# Patient Record
Sex: FEMALE | Race: WHITE | Employment: FULL TIME | URBAN - METROPOLITAN AREA
[De-identification: names, ages, dates, MRNs, and addresses within clinical notes are randomized per-mention and may not be internally consistent; named-entity substitution may affect disease eponyms.]

---

## 2017-07-07 ENCOUNTER — OFFICE VISIT (OUTPATIENT)
Dept: FAMILY MEDICINE CLINIC | Age: 37
End: 2017-07-07

## 2017-07-07 VITALS
HEIGHT: 65 IN | WEIGHT: 154 LBS | HEART RATE: 72 BPM | BODY MASS INDEX: 25.66 KG/M2 | SYSTOLIC BLOOD PRESSURE: 113 MMHG | OXYGEN SATURATION: 99 % | DIASTOLIC BLOOD PRESSURE: 75 MMHG | RESPIRATION RATE: 16 BRPM | TEMPERATURE: 98.3 F

## 2017-07-07 DIAGNOSIS — Z01.419 WELL WOMAN EXAM WITH ROUTINE GYNECOLOGICAL EXAM: ICD-10-CM

## 2017-07-07 DIAGNOSIS — Z01.419 ENCOUNTER FOR WELL WOMAN EXAM: Primary | ICD-10-CM

## 2017-07-07 DIAGNOSIS — Z23 ENCOUNTER FOR IMMUNIZATION: ICD-10-CM

## 2017-07-07 DIAGNOSIS — R53.83 FATIGUE, UNSPECIFIED TYPE: ICD-10-CM

## 2017-07-07 NOTE — MR AVS SNAPSHOT
Visit Information Date & Time Provider Department Dept. Phone Encounter #  
 7/7/2017  9:20 AM Hillary Peñaloza, [facility] [phone] [id number] Upcoming Health Maintenance Date Due DTaP/Tdap/Td series (1 - Tdap) 6/11/2003 INFLUENZA AGE 9 TO ADULT 8/1/2017 PAP AKA CERVICAL CYTOLOGY 2/13/2018 Allergies as of 7/7/2017  Review Complete On: 2/18/2016 By: Hillary Peñaloza MD  
 No Known Allergies Current Immunizations  Reviewed on 2/13/2015 Name Date Influenza Vaccine 11/13/2014 Tdap 7/7/2017 Not reviewed this visit You Were Diagnosed With   
  
 Codes Comments Encounter for well woman exam    -  Primary ICD-10-CM: G98.798 ICD-9-CM: V72.31 Encounter for immunization     ICD-10-CM: U47 ICD-9-CM: V03.89 Fatigue, unspecified type     ICD-10-CM: R53.83 ICD-9-CM: 780.79 Vitals BP Pulse Temp Resp Height(growth percentile) Weight(growth percentile) 113/75 72 98.3 °F (36.8 °C) (Oral) 16 5' 5\" (1.651 m) 154 lb (69.9 kg) LMP SpO2 BMI OB Status Smoking Status 06/12/2017 (Exact Date) 99% 25.63 kg/m2 Having regular periods Never Smoker Vitals History BMI and BSA Data Body Mass Index Body Surface Area  
 25.63 kg/m 2 1.79 m 2 Preferred Pharmacy Pharmacy Name Phone [facility] [address], [address]  [phone] Your Updated Medication List  
  
   
This list is accurate as of: 7/7/17 10:18 AM.  Always use your most recent med list.  
  
  
  
  
 CALTRATE 600+D PLUS MINERALS 600 mg calcium- 400 unit Tab Generic drug:  Calcium Carbonate-Vit D3-Min Take  by mouth. PROBIOTIC PO Take  by mouth. We Performed the Following CBC W/O DIFF [24198 CPT(R)] LIPID PANEL [57884 CPT(R)] METABOLIC PANEL, COMPREHENSIVE [53717 CPT(R)]  TETANUS, DIPHTHERIA TOXOIDS AND ACELLULAR PERTUSSIS VACCINE (TDAP), IN INDIVIDS. >=7, IM B573701 CPT(R)] TSH REFLEX TO T4 [[id number] Custom] Introducing [facility]! [facility] introduces [employer] patient portal. Now you can access parts of your medical record, email your doctor's office, and request medication refills online. 1. In your internet browser, go to [email or web address]://[email or web address]. [employer] 2. Click on the First Time User? Click Here link in the Sign In box. You will see the New Member Sign Up page. 3. Enter your [employer] Access Code exactly as it appears below. You will not need to use this code after youve completed the sign-up process. If you do not sign up before the expiration date, you must request a new code. · [employer] Access Code: [email or web address] Expires: 10/5/2017 10:18 AM 
 
4. Enter the last four digits of your Social Security Number (xxxx) and Date of Birth (mm/dd/yyyy) as indicated and click Submit. You will be taken to the next sign-up page. 5. Create a [employer] ID. This will be your [employer] login ID and cannot be changed, so think of one that is secure and easy to remember. 6. Create a [employer] password. You can change your password at any time. 7. Enter your Password Reset Question and Answer. This can be used at a later time if you forget your password. 8. Enter your e-mail address. You will receive e-mail notification when new information is available in [address] 9. Click Sign Up. You can now view and download portions of your medical record. 10. Click the Download Summary menu link to download a portable copy of your medical information. If you have questions, please visit the Frequently Asked Questions section of the [employer] website. Remember, [employer] is NOT to be used for urgent needs. For medical emergencies, dial 911. Now available from your iPhone and Android! Please provide this summary of care documentation to your next provider. Your primary care clinician is listed as [address] [place] [address]. If you have any questions after today's visit, please call [phone].

## 2017-07-07 NOTE — PROGRESS NOTES
Chief Complaint   Patient presents with    Complete Physical     1. Have you been to the ER, urgent care clinic since your last visit? Hospitalized since your last visit? No    2. Have you seen or consulted any other health care providers outside of the [address] [place] [address] since your last visit? Include any pap smears or colon screening.  No    Pap--2015--no abnormalities    Encounter tetanus

## 2017-07-07 NOTE — PROGRESS NOTES
Presents for annual exam    Now caring for four children -- two of her own, two foster children    Has 6, 8, 6, and almost 3year old -- 8and 3year old are sister and brother, foster children    [place] [employer] calling to adopt    Now her 9th and 10th child fostering -- has had these two children for 16 months    Now working as  at the [employer] school    Feeling much better after a week of vacation  States rough year  Physically does not feel that she is has been taking care of herself    Finished her job last week  Slowly feeling better    Emotionally draining but getting better    [employer] to [place] -- stayed at a [employer] last week    Started taking liquid vitamin D and vitamin B12    Periods -- a little off  Four days late  3 days early the month before   vasectomy    No breast problems  Gets tender before her period  No lumps    Constipation is much better -- uses miralax when she needs it    2/2015 negative and negative HPV HR; no hx of abnormal PAP smear    Subjective:   30 y.o. female for Well Woman Check. Patient's last menstrual period was 06/12/2017 (exact date). Social History: single partner, contraception - vasectomy. Pertinent past medical history:     Patient Active Problem List   Diagnosis Code   (none) - all problems resolved or deleted     Current Outpatient Prescriptions   Medication Sig Dispense Refill    LACTOBACILLUS ACIDOPHILUS (PROBIOTIC PO) Take  by mouth.  Calcium Carbonate-Vit D3-Min (CALTRATE 600+D PLUS MINERALS) 600 mg calcium- 400 unit tab Take  by mouth. No Known Allergies     ROS:  Feeling a little off. No dyspnea or chest pain on exertion. No abdominal pain, change in bowel habits, black or bloody stools. Constipation problem has improved. No urinary tract symptoms. GYN ROS: no breast pain or new or enlarging lumps on self exam, she complains of periods are not as regular as previously. No neurological complaints.     Objective:     Visit Vitals    /75    Pulse 72    Temp 98.3 °F (36.8 °C) (Oral)    Resp 16    Ht 5' 5\" (1.651 m)    Wt 154 lb (69.9 kg)    LMP 06/12/2017 (Exact Date)    SpO2 99%    BMI 25.63 kg/m2     The patient appears well, alert, oriented x 3, in no distress. ENT normal.  Neck supple. No adenopathy or thyromegaly. OIDN. Lungs are clear, good air entry, no wheezes, rhonchi or rales. S1 and S2 normal, no murmurs, regular rate and rhythm. Abdomen soft without tenderness, guarding, mass or organomegaly. Extremities show no edema, normal peripheral pulses. Neurological is normal, no focal findings. BREAST EXAM: not examined    PELVIC EXAM: normal external genitalia, vulva, vagina, cervix, uterus and adnexa    Assessment/Plan:   well woman  pap smear  return annually or prn    ICD-10-CM ICD-9-CM    1. Encounter for well woman exam Z01.419 V72.31 LIPID PANEL      METABOLIC PANEL, COMPREHENSIVE      CBC W/O DIFF      PAP IG, APTIMA HPV AND RFX 16/18,45 ([id number])   2. Encounter for immunization Z23 V03.89 TETANUS, DIPHTHERIA TOXOIDS AND ACELLULAR PERTUSSIS VACCINE (TDAP), IN INDIVIDS. >=7, IM   3. Fatigue, unspecified type R53.83 780.79 TSH REFLEX TO T4   4. Well woman exam with routine gynecological exam Z01.419 V72.31     [V72.31]   .

## 2017-07-07 NOTE — LETTER
7/17/2017 8:53 AM 
 
Ms. Elizabeth Clemente [address] [address] Dear Elizabeth Clemente: 
 
Please find your most recent results below. Resulted Orders LIPID PANEL Result Value Ref Range Cholesterol, total 154 100 - 199 mg/dL Triglyceride 64 0 - 149 mg/dL HDL Cholesterol 71 >39 mg/dL VLDL, calculated 13 5 - 40 mg/dL LDL, calculated 70 0 - 99 mg/dL Narrative Performed at:  [address]  [id number] : Delma Chadwick MD, Phone:  [phone] METABOLIC PANEL, COMPREHENSIVE Result Value Ref Range Glucose 85 65 - 99 mg/dL BUN 10 6 - 20 mg/dL Creatinine 0.78 0.57 - 1.00 mg/dL GFR est non-AA 99 >59 mL/min/1.73 GFR est  >59 mL/min/1.73  
 BUN/Creatinine ratio 13 9 - 23 Sodium 140 134 - 144 mmol/L Potassium 4.8 3.5 - 5.2 mmol/L Chloride 101 96 - 106 mmol/L  
 CO2 22 18 - 29 mmol/L Calcium 9.4 8.7 - 10.2 mg/dL Protein, total 7.2 6.0 - 8.5 g/dL Albumin 4.6 3.5 - 5.5 g/dL GLOBULIN, TOTAL 2.6 1.5 - 4.5 g/dL A-G Ratio 1.8 1.2 - 2.2 Bilirubin, total 1.3 (H) 0.0 - 1.2 mg/dL Alk. phosphatase 58 39 - 117 IU/L  
 AST (SGOT) 18 0 - 40 IU/L  
 ALT (SGPT) 15 0 - 32 IU/L Narrative Performed at:  [address]  [id number] : Delma Chadwick MD, Phone:  [phone] CBC W/O DIFF Result Value Ref Range WBC 5.8 3.4 - 10.8 x10E3/uL  
 RBC 4.49 3.77 - 5.28 x10E6/uL HGB 13.6 11.1 - 15.9 g/dL HCT 40.3 34.0 - 46.6 % MCV 90 79 - 97 fL  
 MCH 30.3 26.6 - 33.0 pg  
 MCHC 33.7 31.5 - 35.7 g/dL  
 RDW 13.2 12.3 - 15.4 % PLATELET 821 678 - 441 x10E3/uL Narrative Performed at:  [address]  [id number] : Delma Chadwick MD, Phone:  [phone] TSH REFLEX TO T4 Result Value Ref Range TSH 1.710 0.450 - 4.500 uIU/mL Narrative Performed at:  [address]  [id number] : Chacha Saunders MD, Phone:  [phone] PAP IG, APTIMA HPV AND RFX 16/18,45 ([id number]) Result Value Ref Range Diagnosis Comment Comment:  
   NEGATIVE FOR INTRAEPITHELIAL LESION AND MALIGNANCY. Specimen adequacy Comment Comment:  
   Satisfactory for evaluation. Endocervical and/or squamous metaplastic 
cells (endocervical component) are present. Clinician provided ICD10 Comment Comment:  
   Z01.419 Performed by: Comment Comment:  
   Arleth Em, Cytotechnologist (ASCP) Surendra Saenz Note: Comment Comment: The Pap smear is a screening test designed to aid in the detection of 
premalignant and malignant conditions of the uterine cervix. It is not a 
diagnostic procedure and should not be used as the sole means of detecting 
cervical cancer. Both false-positive and false-negative reports do occur. Test methodology Comment Comment: This liquid based ThinPrep(R) pap test was screened with the 
use of an image guided system. HPV APTIMA Negative Negative Comment: This test detects fourteen high-risk HPV types (16/18/31/33/35/39/45/ 
51/52/56/58/59/66/68) without differentiation. Narrative Specimen Comment: Source. ........... Surendra Saenz Endocervix Specimen Comment: No. of containers. Surendra Saenz 01 CYTYC Thin Prep Vial 
Performed at:  [address]  [id number] : Chacha Saunders MD, Phone:  [phone] Performed at:  [address] 
[address]  [id number] : Chacha Saunders MD, Phone:  [phone] CVD REPORT Result Value Ref Range INTERPRETATION Note Comment:  
   Supplement report is available. Narrative Performed at:  [address] [place] [address]  [id number] : Garo Garibay PhD, Phone:  [phone] RECOMMENDATIONS: 
 
Negative PAP smear and negative HPV HR Please call me if you have any questions: [phone] Sincerely, Hannah Jones MD

## 2017-07-07 NOTE — LETTER
7/10/2017 2:49 PM 
 
Ms. Ronal Mendez [address] [address] Dear Ronal Mendez: 
 
Please find your most recent results below. Resulted Orders LIPID PANEL Result Value Ref Range Cholesterol, total 154 100 - 199 mg/dL Triglyceride 64 0 - 149 mg/dL HDL Cholesterol 71 >39 mg/dL VLDL, calculated 13 5 - 40 mg/dL LDL, calculated 70 0 - 99 mg/dL Narrative Performed at:  [address]  [id number] : Amado Ahuja MD, Phone:  [phone] METABOLIC PANEL, COMPREHENSIVE Result Value Ref Range Glucose 85 65 - 99 mg/dL BUN 10 6 - 20 mg/dL Creatinine 0.78 0.57 - 1.00 mg/dL GFR est non-AA 99 >59 mL/min/1.73 GFR est  >59 mL/min/1.73  
 BUN/Creatinine ratio 13 9 - 23 Sodium 140 134 - 144 mmol/L Potassium 4.8 3.5 - 5.2 mmol/L Chloride 101 96 - 106 mmol/L  
 CO2 22 18 - 29 mmol/L Calcium 9.4 8.7 - 10.2 mg/dL Protein, total 7.2 6.0 - 8.5 g/dL Albumin 4.6 3.5 - 5.5 g/dL GLOBULIN, TOTAL 2.6 1.5 - 4.5 g/dL A-G Ratio 1.8 1.2 - 2.2 Bilirubin, total 1.3 (H) 0.0 - 1.2 mg/dL Alk. phosphatase 58 39 - 117 IU/L  
 AST (SGOT) 18 0 - 40 IU/L  
 ALT (SGPT) 15 0 - 32 IU/L Narrative Performed at:  [address]  [id number] : Amado Ahuja MD, Phone:  [phone] CBC W/O DIFF Result Value Ref Range WBC 5.8 3.4 - 10.8 x10E3/uL  
 RBC 4.49 3.77 - 5.28 x10E6/uL HGB 13.6 11.1 - 15.9 g/dL HCT 40.3 34.0 - 46.6 % MCV 90 79 - 97 fL  
 MCH 30.3 26.6 - 33.0 pg  
 MCHC 33.7 31.5 - 35.7 g/dL  
 RDW 13.2 12.3 - 15.4 % PLATELET 767 851 - 090 x10E3/uL Narrative Performed at:  [address]  [id number] : Amado Ahuja MD, Phone:  [phone] TSH REFLEX TO T4 Result Value Ref Range TSH 1.710 0.450 - 4.500 uIU/mL Narrative Performed at:  [address]  [id number] : Mayela Kam MD, Phone:  [phone] CVD REPORT Result Value Ref Range INTERPRETATION Note Comment:  
   Supplement report is available. Narrative Performed at:  [address] [place] [address]  [id number] : Kimball Brittle PhD, Phone:  [phone] RECOMMENDATIONS: 
 
Normal cholesterol level Normal glucose level Normal kidney function Slightly increased bilirubin level (just barely -- recommend repeating in 3-6 months after good night's rest) No anemia Normal thyroid function Please call me if you have any questions: [phone] Sincerely, Kenney Hancock MD

## 2017-07-08 LAB
ALBUMIN SERPL-MCNC: 4.6 G/DL (ref 3.5–5.5)
ALBUMIN/GLOB SERPL: 1.8 {RATIO} (ref 1.2–2.2)
ALP SERPL-CCNC: 58 IU/L (ref 39–117)
ALT SERPL-CCNC: 15 IU/L (ref 0–32)
AST SERPL-CCNC: 18 IU/L (ref 0–40)
BILIRUB SERPL-MCNC: 1.3 MG/DL (ref 0–1.2)
BUN SERPL-MCNC: 10 MG/DL (ref 6–20)
BUN/CREAT SERPL: 13 (ref 9–23)
CALCIUM SERPL-MCNC: 9.4 MG/DL (ref 8.7–10.2)
CHLORIDE SERPL-SCNC: 101 MMOL/L (ref 96–106)
CHOLEST SERPL-MCNC: 154 MG/DL (ref 100–199)
CO2 SERPL-SCNC: 22 MMOL/L (ref 18–29)
CREAT SERPL-MCNC: 0.78 MG/DL (ref 0.57–1)
ERYTHROCYTE [DISTWIDTH] IN BLOOD BY AUTOMATED COUNT: 13.2 % (ref 12.3–15.4)
GLOBULIN SER CALC-MCNC: 2.6 G/DL (ref 1.5–4.5)
GLUCOSE SERPL-MCNC: 85 MG/DL (ref 65–99)
HCT VFR BLD AUTO: 40.3 % (ref 34–46.6)
HDLC SERPL-MCNC: 71 MG/DL
HGB BLD-MCNC: 13.6 G/DL (ref 11.1–15.9)
INTERPRETATION, 910389: NORMAL
LDLC SERPL CALC-MCNC: 70 MG/DL (ref 0–99)
MCH RBC QN AUTO: 30.3 PG (ref 26.6–33)
MCHC RBC AUTO-ENTMCNC: 33.7 G/DL (ref 31.5–35.7)
MCV RBC AUTO: 90 FL (ref 79–97)
PLATELET # BLD AUTO: 242 X10E3/UL (ref 150–379)
POTASSIUM SERPL-SCNC: 4.8 MMOL/L (ref 3.5–5.2)
PROT SERPL-MCNC: 7.2 G/DL (ref 6–8.5)
RBC # BLD AUTO: 4.49 X10E6/UL (ref 3.77–5.28)
SODIUM SERPL-SCNC: 140 MMOL/L (ref 134–144)
TRIGL SERPL-MCNC: 64 MG/DL (ref 0–149)
TSH SERPL DL<=0.005 MIU/L-ACNC: 1.71 UIU/ML (ref 0.45–4.5)
VLDLC SERPL CALC-MCNC: 13 MG/DL (ref 5–40)
WBC # BLD AUTO: 5.8 X10E3/UL (ref 3.4–10.8)

## 2017-07-09 NOTE — PROGRESS NOTES
Normal cholesterol level  Normal glucose level  Normal kidney function  Slightly increased bilirubin level (just barely -- recommend repeating in 3-6 months after good night's rest)  No anemia  Normal thyroid function

## 2017-07-13 LAB
CYTOLOGIST CVX/VAG CYTO: NORMAL
CYTOLOGY CVX/VAG DOC THIN PREP: NORMAL
DX ICD CODE: NORMAL
HPV I/H RISK 4 DNA CVX QL PROBE+SIG AMP: NEGATIVE
Lab: NORMAL
OTHER STN SPEC: NORMAL
PATH REPORT.FINAL DX SPEC: NORMAL
STAT OF ADQ CVX/VAG CYTO-IMP: NORMAL

## 2018-07-09 ENCOUNTER — TELEPHONE (OUTPATIENT)
Dept: FAMILY MEDICINE CLINIC | Age: 38
End: 2018-07-09

## 2018-07-09 DIAGNOSIS — Z01.419 WELL WOMAN EXAM: Primary | ICD-10-CM

## 2018-07-09 NOTE — TELEPHONE ENCOUNTER
----- Message from Suzie Marcos sent at 7/9/2018 12:12 PM EDT -----  Regarding: Dr. Masha Chow / Telephone   Pt has an upcoming appt on Monday, August 6, 2018 at 2p. Pt would like to speak with somebody to come in earlier to do her blood work.  Best contact: [phone]

## 2018-07-13 NOTE — TELEPHONE ENCOUNTER
Second attempt at calling patient to schedule lab appointment. Left voicemail for patient to return call.

## 2018-07-27 ENCOUNTER — LAB ONLY (OUTPATIENT)
Dept: FAMILY MEDICINE CLINIC | Age: 38
End: 2018-07-27

## 2018-07-27 DIAGNOSIS — Z01.419 WELL WOMAN EXAM: ICD-10-CM

## 2018-07-28 LAB
ALBUMIN SERPL-MCNC: 4.7 G/DL (ref 3.5–5.5)
ALBUMIN/GLOB SERPL: 2 {RATIO} (ref 1.2–2.2)
ALP SERPL-CCNC: 65 IU/L (ref 39–117)
ALT SERPL-CCNC: 13 IU/L (ref 0–32)
AST SERPL-CCNC: 21 IU/L (ref 0–40)
BILIRUB SERPL-MCNC: 0.9 MG/DL (ref 0–1.2)
BUN SERPL-MCNC: 11 MG/DL (ref 6–20)
BUN/CREAT SERPL: 16 (ref 9–23)
CALCIUM SERPL-MCNC: 9.6 MG/DL (ref 8.7–10.2)
CHLORIDE SERPL-SCNC: 102 MMOL/L (ref 96–106)
CHOLEST SERPL-MCNC: 143 MG/DL (ref 100–199)
CO2 SERPL-SCNC: 24 MMOL/L (ref 20–29)
CREAT SERPL-MCNC: 0.69 MG/DL (ref 0.57–1)
ERYTHROCYTE [DISTWIDTH] IN BLOOD BY AUTOMATED COUNT: 13 % (ref 12.3–15.4)
EST. AVERAGE GLUCOSE BLD GHB EST-MCNC: 97 MG/DL
GLOBULIN SER CALC-MCNC: 2.4 G/DL (ref 1.5–4.5)
GLUCOSE SERPL-MCNC: 77 MG/DL (ref 65–99)
HBA1C MFR BLD: 5 % (ref 4.8–5.6)
HCT VFR BLD AUTO: 42.7 % (ref 34–46.6)
HDLC SERPL-MCNC: 60 MG/DL
HGB BLD-MCNC: 14 G/DL (ref 11.1–15.9)
INTERPRETATION, 910389: NORMAL
LDLC SERPL CALC-MCNC: 66 MG/DL (ref 0–99)
MCH RBC QN AUTO: 29.5 PG (ref 26.6–33)
MCHC RBC AUTO-ENTMCNC: 32.8 G/DL (ref 31.5–35.7)
MCV RBC AUTO: 90 FL (ref 79–97)
PLATELET # BLD AUTO: 269 X10E3/UL (ref 150–379)
POTASSIUM SERPL-SCNC: 4.2 MMOL/L (ref 3.5–5.2)
PROT SERPL-MCNC: 7.1 G/DL (ref 6–8.5)
RBC # BLD AUTO: 4.74 X10E6/UL (ref 3.77–5.28)
SODIUM SERPL-SCNC: 138 MMOL/L (ref 134–144)
TRIGL SERPL-MCNC: 86 MG/DL (ref 0–149)
TSH SERPL DL<=0.005 MIU/L-ACNC: 3.52 UIU/ML (ref 0.45–4.5)
VLDLC SERPL CALC-MCNC: 17 MG/DL (ref 5–40)
WBC # BLD AUTO: 6.4 X10E3/UL (ref 3.4–10.8)

## 2018-07-28 NOTE — PROGRESS NOTES
She needs to be rescheduled. It says she has an appt with me on Aug 6th at 2 pm but I won't be there that day. Thanks.

## 2018-08-07 ENCOUNTER — OFFICE VISIT (OUTPATIENT)
Dept: FAMILY MEDICINE CLINIC | Age: 38
End: 2018-08-07

## 2018-08-07 VITALS
TEMPERATURE: 98.1 F | BODY MASS INDEX: 26.66 KG/M2 | DIASTOLIC BLOOD PRESSURE: 77 MMHG | HEIGHT: 65 IN | RESPIRATION RATE: 16 BRPM | SYSTOLIC BLOOD PRESSURE: 125 MMHG | HEART RATE: 73 BPM | OXYGEN SATURATION: 99 % | WEIGHT: 160 LBS

## 2018-08-07 DIAGNOSIS — F43.22 ADJUSTMENT DISORDER WITH ANXIOUS MOOD: ICD-10-CM

## 2018-08-07 DIAGNOSIS — Z01.419 WELL WOMAN EXAM WITH ROUTINE GYNECOLOGICAL EXAM: Primary | ICD-10-CM

## 2018-08-07 NOTE — PATIENT INSTRUCTIONS

## 2018-08-07 NOTE — PROGRESS NOTES
Efe Varela  41 y.o. female  1980  [address]  [id number]   [address]: Progress Note  Jaret Moeller MD       Encounter Date: 2018    Chief Complaint   Patient presents with    Complete Physical     with pap     History of Present Illness   Efe Varela is a 41 y.o. female who presents to clinic today for CPE without pap. Specific concerns today: dyspnea related to stress. Prev on zoloft in  for FRANCIS and panic disorder. She has had increased life stressors with foster children, adoption. She has been doing family therapy. She denies any other complaints since her last visit. Pertinent past medical history: no history of HTN, DVT, CAD, DM, liver disease, migraines or smoking. OB History:  OB History      Para Term  AB Living    2 2 2  0 2    SAB TAB Ectopic Molar Multiple Live Births                 Obstetric Comments    S/p   and  6 lbs 15 oz and 7 lbs 15 oz. No complications. [id number], spontaneous vaginal delivery  Prenatal complications: no complications. GYN history:  Patient's last menstrual period was 2018. Cycle: regular, Duration: 5 days  Sexually Active?: yes with ;    Method of family planning/contraception?: s/p vasectomy  History of STDs?: none. Preventative care:  Last PAP (7017): NILM HPV neg due in   - Results:  o Every three years with cytology alone for women 24to 34years of age  o Every three years with cytology alone or every five years if combined with HPV testing in women 27to 72years of age  o Screening may be stopped at 71 y/o or after total hysterectomy if normal screening exams  Depression screen:    Over the past 2 weeks, have you felt down, depressed, or hopeless? no   Over the past 2 weeks, have you felt little interest or pleasure in doing things? No  FRANCIS 7-score of 7  Last Flu Shot: up to date.   TDAP in last 10 yrs: UTD  Folic acid 400-800mg if of childbearing age: sporadically compliant with her [employer]  Exercise and diet: diet going ok. Exercise is walking with her children  Women 54to 78years of age should take 75 mg of aspirin per day if no contraindication:   Screen for 1-time HCV screening if born btwn 1667-9098    Any family history of gyn cancers (breast, ovarian or cervical ca)? Negative. Social History:  Tobacco use: none  Alcohol use: 8 times per week. Less during the school year. Denies any concerns for her drinking. Or any need to cut back. Do you feel safe at home? Yes.   single partner, contraception - vasectomy. Review of Systems   Review of Systems   Constitutional: Negative. Psychiatric/Behavioral: Negative for depression, hallucinations, substance abuse and suicidal ideas. The patient is nervous/anxious. All other systems reviewed and are negative. Vitals/Objective:     Vitals:    08/07/18 1008   BP: 125/77   Pulse: 73   Resp: 16   Temp: 98.1 °F (36.7 °C)   TempSrc: Oral   SpO2: 99%   Weight: 160 lb (72.6 kg)   Height: 5' 5\" (1.651 m)     Body mass index is 26.63 kg/(m^2). Physical Exam   Constitutional: She appears well-developed and well-nourished. No distress. HENT:   Head: Normocephalic and atraumatic. Right Ear: Tympanic membrane normal.   Left Ear: Tympanic membrane normal.   Mouth/Throat: Uvula is midline, oropharynx is clear and moist and mucous membranes are normal.   Eyes: Pupils are equal, round, and reactive to light. Neck: Neck supple. No thyromegaly present. Cardiovascular: Normal rate, regular rhythm and normal heart sounds. Exam reveals no gallop and no friction rub. No murmur heard. Pulmonary/Chest: Effort normal and breath sounds normal. No respiratory distress. She has no wheezes. She has no rales. Right breast exhibits no inverted nipple, no mass, no nipple discharge, no skin change and no tenderness.  Left breast exhibits no inverted nipple, no mass, no nipple discharge, no skin change and no tenderness. Abdominal: Soft. Bowel sounds are normal. She exhibits no distension. There is no tenderness. There is no rebound and no guarding. Genitourinary: Vagina normal and uterus normal. Cervix exhibits no motion tenderness, no discharge and no friability. No vaginal discharge found. Genitourinary Comments: Exam chaperoned by ABRIL   Skin: She is not diaphoretic. Vitals reviewed. Lab Results   Component Value Date/Time    WBC 6.4 07/27/2018 08:05 AM    HGB 14.0 07/27/2018 08:05 AM    HCT 42.7 07/27/2018 08:05 AM    PLATELET 684 57/89/3188 08:05 AM    MCV 90 07/27/2018 08:05 AM     Lab Results   Component Value Date/Time    Sodium 138 07/27/2018 08:05 AM    Potassium 4.2 07/27/2018 08:05 AM    Chloride 102 07/27/2018 08:05 AM    CO2 24 07/27/2018 08:05 AM    Glucose 77 07/27/2018 08:05 AM    BUN 11 07/27/2018 08:05 AM    Creatinine 0.69 07/27/2018 08:05 AM    BUN/Creatinine ratio 16 07/27/2018 08:05 AM    GFR est  07/27/2018 08:05 AM    GFR est non- 07/27/2018 08:05 AM    Calcium 9.6 07/27/2018 08:05 AM    Bilirubin, total 0.9 07/27/2018 08:05 AM    AST (SGOT) 21 07/27/2018 08:05 AM    Alk. phosphatase 65 07/27/2018 08:05 AM    Protein, total 7.1 07/27/2018 08:05 AM    Albumin 4.7 07/27/2018 08:05 AM    A-G Ratio 2.0 07/27/2018 08:05 AM    ALT (SGPT) 13 07/27/2018 08:05 AM       No results found for this or any previous visit (from the past 24 hour(s)). Assessment and Plan:   1. Well woman exam with routine gynecological exam  Labs and pap UTD. 2. Adjustment disorder with anxious mood  Neg PHQ 2 and FRANCIS 7 mildly elevated. No current medication at this time. Cont counseling.   - BEHAV ASSMT W/SCORE & DOCD/STAND INSTRUMENT    I have discussed the diagnosis with the patient and the intended plan as seen in the above orders. she has expressed understanding. The patient has received an after-visit summary and questions were answered concerning future plans. I have discussed medication side effects and warnings with the patient as well. Follow-up Disposition:  Return in about 1 year (around 8/7/2019), or if symptoms worsen or fail to improve, for WWE. Electronically Signed: Long Su MD     History   Patients past medical, surgical and family histories were reviewed and updated. Past Medical History:   Diagnosis Date    Constipation      Past Surgical History:   Procedure Laterality Date    IR INJ/ ASP/ ARTHRO SMALL JOINT / BURSA  2001    jaw     Family History   Problem Relation Age of Onset    High Cholesterol Father     Cancer Paternal Grandfather     Heart Disease Paternal Grandfather     Arthritis-rheumatoid Paternal Grandfather     Diabetes Maternal Grandmother     Heart Attack Maternal Grandmother     High Cholesterol Maternal Grandmother     Heart Disease Maternal Grandfather     High Cholesterol Maternal Grandfather     High Cholesterol Paternal Grandmother      Social History     Social History    Marital status:      Spouse name: N/A    Number of children: N/A    Years of education: N/A     Occupational History    teacher      Social History Main Topics    Smoking status: Never Smoker    Smokeless tobacco: Never Used    Alcohol use 0.0 oz/week     1 - 2 Glasses of wine per week    Drug use: No    Sexual activity: Yes     Partners: Male     Birth control/ protection: None     Other Topics Concern    Not on file     Social History Narrative            Current Medications/Allergies     Current Outpatient Prescriptions   Medication Sig Dispense Refill    LACTOBACILLUS ACIDOPHILUS (PROBIOTIC PO) Take  by mouth.  Calcium Carbonate-Vit D3-Min (CALTRATE 600+D PLUS MINERALS) 600 mg calcium- 400 unit tab Take  by mouth.        No Known Allergies

## 2018-08-07 NOTE — PROGRESS NOTES
Chief Complaint   Patient presents with    Complete Physical     with pap     1. Have you been to the ER, urgent care clinic since your last visit? Hospitalized since your last visit? No    2. Have you seen or consulted any other health care providers outside of the [address] [place] [address] since your last visit? Include any pap smears or colon screening.  No

## 2019-12-19 ENCOUNTER — OFFICE VISIT (OUTPATIENT)
Dept: FAMILY MEDICINE CLINIC | Age: 39
End: 2019-12-19

## 2019-12-19 VITALS
HEART RATE: 74 BPM | BODY MASS INDEX: 25.33 KG/M2 | SYSTOLIC BLOOD PRESSURE: 111 MMHG | WEIGHT: 152 LBS | RESPIRATION RATE: 18 BRPM | DIASTOLIC BLOOD PRESSURE: 73 MMHG | OXYGEN SATURATION: 99 % | HEIGHT: 65 IN | TEMPERATURE: 98.3 F

## 2019-12-19 DIAGNOSIS — Z00.00 HEALTHCARE MAINTENANCE: Primary | ICD-10-CM

## 2019-12-19 DIAGNOSIS — Z00.00 HEALTHCARE MAINTENANCE: ICD-10-CM

## 2019-12-19 PROBLEM — E55.9 VITAMIN D DEFICIENCY: Status: ACTIVE | Noted: 2019-12-19

## 2019-12-19 NOTE — PATIENT INSTRUCTIONS
Well Visit, Ages 25 to 48: Care Instructions Your Care Instructions Physical exams can help you stay healthy. Your doctor has checked your overall health and may have suggested ways to take good care of yourself. He or she also may have recommended tests. At home, you can help prevent illness with healthy eating, regular exercise, and other steps. Follow-up care is a key part of your treatment and safety. Be sure to make and go to all appointments, and call your doctor if you are having problems. It's also a good idea to know your test results and keep a list of the medicines you take. How can you care for yourself at home? · Reach and stay at a healthy weight. This will lower your risk for many problems, such as obesity, diabetes, heart disease, and high blood pressure. · Get at least 30 minutes of physical activity on most days of the week. Walking is a good choice. You also may want to do other activities, such as running, swimming, cycling, or playing tennis or team sports. Discuss any changes in your exercise program with your doctor. · Do not smoke or allow others to smoke around you. If you need help quitting, talk to your doctor about stop-smoking programs and medicines. These can increase your chances of quitting for good. · Talk to your doctor about whether you have any risk factors for sexually transmitted infections (STIs). Having one sex partner (who does not have STIs and does not have sex with anyone else) is a good way to avoid these infections. · Use birth control if you do not want to have children at this time. Talk with your doctor about the choices available and what might be best for you. · Protect your skin from too much sun. When you're outdoors from 10 a.m. to 4 p.m., stay in the shade or cover up with clothing and a hat with a wide brim. Wear sunglasses that block UV rays. Even when it's cloudy, put broad-spectrum sunscreen (SPF 30 or higher) on any exposed skin. · See a dentist one or two times a year for checkups and to have your teeth cleaned. · Wear a seat belt in the car. Follow your doctor's advice about when to have certain tests. These tests can spot problems early. For everyone · Cholesterol. Have the fat (cholesterol) in your blood tested after age 21. Your doctor will tell you how often to have this done based on your age, family history, or other things that can increase your risk for heart disease. · Blood pressure. Have your blood pressure checked during a routine doctor visit. Your doctor will tell you how often to check your blood pressure based on your age, your blood pressure results, and other factors. · Vision. Talk with your doctor about how often to have a glaucoma test. 
· Diabetes. Ask your doctor whether you should have tests for diabetes. · Colon cancer. Your risk for colorectal cancer gets higher as you get older. Some experts say that adults should start regular screening at age 48 and stop at age 76. Others say to start before age 48 or continue after age 76. Talk with your doctor about your risk and when to start and stop screening. For women · Breast exam and mammogram. Talk to your doctor about when you should have a clinical breast exam and a mammogram. Medical experts differ on whether and how often women under 50 should have these tests. Your doctor can help you decide what is right for you. · Cervical cancer screening test and pelvic exam. Begin with a Pap test at age 24. The test often is part of a pelvic exam. Starting at age 27, you may choose to have a Pap test, an HPV test, or both tests at the same time (called co-testing). Talk with your doctor about how often to have testing. · Tests for sexually transmitted infections (STIs). Ask whether you should have tests for STIs. You may be at risk if you have sex with more than one person, especially if your partners do not wear condoms. For men · Tests for sexually transmitted infections (STIs). Ask whether you should have tests for STIs. You may be at risk if you have sex with more than one person, especially if you do not wear a condom. · Testicular cancer exam. Ask your doctor whether you should check your testicles regularly. · Prostate exam. Talk to your doctor about whether you should have a blood test (called a PSA test) for prostate cancer. Experts differ on whether and when men should have this test. Some experts suggest it if you are older than 39 and are -American or have a father or brother who got prostate cancer when he was younger than 72. When should you call for help? Watch closely for changes in your health, and be sure to contact your doctor if you have any problems or symptoms that concern you. Where can you learn more? Go to http://isabella-kurtis.info/. Enter P072 in the search box to learn more about \"Well Visit, Ages 25 to 48: Care Instructions. \" Current as of: December 13, 2018 Content Version: 12.2 © 6845-2809 [employer]. Care instructions adapted under license by [employer] (which disclaims liability or warranty for this information). If you have questions about a medical condition or this instruction, always ask your healthcare professional. [address] any warranty or liability for your use of this information.

## 2019-12-19 NOTE — PROGRESS NOTES
Identified Patient with two Patient identifiers (Name and ). Two Patient Identifiers confirmed. Reviewed record in preparation for visit and have obtained necessary documentation. Chief Complaint   Patient presents with    Complete Physical       Visit Vitals  /73 (BP 1 Location: Right arm, BP Patient Position: Sitting)   Pulse 74   Temp 98.3 °F (36.8 °C) (Oral)   Resp 18   Ht 5' 5\" (1.651 m)   Wt 152 lb (68.9 kg)   SpO2 99%   BMI 25.29 kg/m²       1. Have you been to the ER, urgent care clinic since your last visit? Hospitalized since your last visit? No    2. Have you seen or consulted any other health care providers outside of the [address] [place] [address] since your last visit? Include any pap smears or colon screening.  No

## 2019-12-19 NOTE — PROGRESS NOTES
HPI:  James Moore is a 42 y.o. female presenting for CPE. PMH: Vit D Deficiency    On chart review had a nodule in 2011 after delivering however resolved after breastfeeding. Ultrasound showed no cystic or solid mass, BI-RADS 2.  Annual screening mammography recommended at age 36. Vitamin D deficiency: IN 2014 Vit D was 25, took vitamin d for a couple of years. LMP 12/10/19  Length of periods: 5 days  Regular  Menstrual flow: light. [id number]  Sexually active?: yes  Method of family planning:  had a vastectomy  Diet: healthy, vegetables daily   Exercise: walks, 9000 steps is a      Immunizations - reviewed:   Immunization History   Administered Date(s) Administered    Influenza Vaccine 11/13/2014    Tdap 07/07/2017     Flu: up to date got it at work       Health Maintenance reviewed -  Pap smear 2017 next due 2022  Mammogram  Annual screening mammography recommended at age 36 per ultrasound  Paternal grandmother was diagnosed with breast cancer at in her 62s  Colonoscopy no colon cancer  HIV testing negative    Allergies- reviewed:   No Known Allergies      Medications- reviewed:   Current Outpatient Medications   Medication Sig    LACTOBACILLUS ACIDOPHILUS (PROBIOTIC PO) Take  by mouth.  Calcium Carbonate-Vit D3-Min (CALTRATE 600+D PLUS MINERALS) 600 mg calcium- 400 unit tab Take  by mouth. No current facility-administered medications for this visit.           Past Medical History- reviewed:  Past Medical History:   Diagnosis Date    Constipation          Past Surgical History- reviewed:   Past Surgical History:   Procedure Laterality Date    IR INJ/ ASP/ ARTHRO SMALL JOINT / BURSA  2001    jaw         Family History - reviewed:  Family History   Problem Relation Age of Onset    High Cholesterol Father     Cancer Paternal Grandfather     Heart Disease Paternal Grandfather     Arthritis-rheumatoid Paternal Grandfather     Diabetes Maternal Grandmother     Heart Attack Maternal Grandmother     High Cholesterol Maternal Grandmother     Heart Disease Maternal Grandfather     High Cholesterol Maternal Grandfather     High Cholesterol Paternal Grandmother          Social History - reviewed:  Social History     Socioeconomic History    Marital status:      Spouse name: Not on file    Number of children: Not on file    Years of education: Not on file    Highest education level: Not on file   Occupational History    Occupation: teacher   Social Needs    Financial resource strain: Not on file    Food insecurity:     Worry: Not on file     Inability: Not on file    Transportation needs:     Medical: Not on file     Non-medical: Not on file   Tobacco Use    Smoking status: Never Smoker    Smokeless tobacco: Never Used   Substance and Sexual Activity    Alcohol use:  Yes     Alcohol/week: 0.0 standard drinks     Types: 1 - 2 Glasses of wine per week    Drug use: No    Sexual activity: Yes     Partners: Male     Birth control/protection: None   Lifestyle    Physical activity:     Days per week: Not on file     Minutes per session: Not on file    Stress: Not on file   Relationships    Social connections:     Talks on phone: Not on file     Gets together: Not on file     Attends Church service: Not on file     Active member of club or organization: Not on file     Attends meetings of clubs or organizations: Not on file     Relationship status: Not on file    Intimate partner violence:     Fear of current or ex partner: Not on file     Emotionally abused: Not on file     Physically abused: Not on file     Forced sexual activity: Not on file   Other Topics Concern    Not on file   Social History Narrative    Not on file           Review of Systems   CONSTITUTIONAL: Denies: fever, chills  BREASTS: No masses or nipple discharge      Physical Exam  Visit Vitals  /73 (BP 1 Location: Right arm, BP Patient Position: Sitting)   Pulse 74   Temp 98.3 °F (36.8 °C) (Oral)   Resp 18   Ht 5' 5\" (1.651 m)   Wt 152 lb (68.9 kg)   LMP 12/10/2019 (Exact Date)   SpO2 99%   BMI 25.29 kg/m²       General appearance - alert, well appearing, and in no distress  Ears - bilateral TM's and external ear canals normal  Nose - normal and patent, no erythema, discharge or polyps  Mouth - mucous membranes moist, pharynx normal without lesions  Neck - supple, no significant adenopathy, thyroid exam: thyroid is normal in size without nodules or tenderness  Chest - clear to auscultation, no wheezes, rales or rhonchi, symmetric air entry  Heart - normal rate, regular rhythm, normal S1, S2, no murmurs, rubs, clicks or gallops  Abdomen - soft, nontender, nondistended, no masses or organomegaly  Neurological - alert, oriented, normal speech, no focal findings or movement disorder noted  Musculoskeletal - no joint tenderness, deformity or swelling  Extremities - no pedal edema noted  Skin - normal coloration and turgor, no rashes, no suspicious skin lesions noted  Pelvic - not indicated  Breast - Mammogram ordered today. Breast exam deferred. The American Cancer Society recommends not performing clinical breast examination, given the potential for false-positive findings and lack of evidence for improved outcomes. The USPSTF states that evidence is insufficient to assess additional benefits of clinical breast examination beyond mammography. Assessment/Plan:  Mrs. Eva Marcos is a healthy 42 y.o F with history of vit d deficiency coming in for CPE. Health maintenance:  - Labs ordered: lipid panel, CMP, CBC, lipid panel, A1c (family history of T2DM) and vit D  - Mammogram at 42y.o.  - Pap due in 2022  - Up to date on immunizations. I have discussed the diagnosis with the patient and the intended plan as seen in the above orders. The patient has received an after-visit summary and questions were answered concerning future plans.   I have discussed medication side effects and warnings with the patient as well. Informed pt to return to the office if new symptoms arise. Patient was discussed with Dr. Jodrin Barrera, Attending Physician.      Emmett Barry MD

## 2019-12-20 LAB
25(OH)D3+25(OH)D2 SERPL-MCNC: 32.8 NG/ML (ref 30–100)
ALBUMIN SERPL-MCNC: 5 G/DL (ref 3.5–5.5)
ALBUMIN/GLOB SERPL: 1.9 {RATIO} (ref 1.2–2.2)
ALP SERPL-CCNC: 70 IU/L (ref 39–117)
ALT SERPL-CCNC: 13 IU/L (ref 0–32)
AST SERPL-CCNC: 16 IU/L (ref 0–40)
BILIRUB SERPL-MCNC: 1 MG/DL (ref 0–1.2)
BUN SERPL-MCNC: 8 MG/DL (ref 6–20)
BUN/CREAT SERPL: 10 (ref 9–23)
CALCIUM SERPL-MCNC: 9.8 MG/DL (ref 8.7–10.2)
CHLORIDE SERPL-SCNC: 102 MMOL/L (ref 96–106)
CHOLEST SERPL-MCNC: 154 MG/DL (ref 100–199)
CO2 SERPL-SCNC: 24 MMOL/L (ref 20–29)
CREAT SERPL-MCNC: 0.78 MG/DL (ref 0.57–1)
ERYTHROCYTE [DISTWIDTH] IN BLOOD BY AUTOMATED COUNT: 11.7 % (ref 12.3–15.4)
EST. AVERAGE GLUCOSE BLD GHB EST-MCNC: 94 MG/DL
GLOBULIN SER CALC-MCNC: 2.6 G/DL (ref 1.5–4.5)
GLUCOSE SERPL-MCNC: 83 MG/DL (ref 65–99)
HBA1C MFR BLD: 4.9 % (ref 4.8–5.6)
HCT VFR BLD AUTO: 41.3 % (ref 34–46.6)
HDLC SERPL-MCNC: 75 MG/DL
HGB BLD-MCNC: 14.2 G/DL (ref 11.1–15.9)
INTERPRETATION, 910389: NORMAL
LDLC SERPL CALC-MCNC: 69 MG/DL (ref 0–99)
MCH RBC QN AUTO: 30.4 PG (ref 26.6–33)
MCHC RBC AUTO-ENTMCNC: 34.4 G/DL (ref 31.5–35.7)
MCV RBC AUTO: 88 FL (ref 79–97)
PLATELET # BLD AUTO: 313 X10E3/UL (ref 150–450)
POTASSIUM SERPL-SCNC: 4.3 MMOL/L (ref 3.5–5.2)
PROT SERPL-MCNC: 7.6 G/DL (ref 6–8.5)
RBC # BLD AUTO: 4.67 X10E6/UL (ref 3.77–5.28)
SODIUM SERPL-SCNC: 140 MMOL/L (ref 134–144)
TRIGL SERPL-MCNC: 49 MG/DL (ref 0–149)
VLDLC SERPL CALC-MCNC: 10 MG/DL (ref 5–40)
WBC # BLD AUTO: 7.1 X10E3/UL (ref 3.4–10.8)

## 2022-03-18 PROBLEM — E55.9 VITAMIN D DEFICIENCY: Status: ACTIVE | Noted: 2019-12-19

## 2024-07-17 ENCOUNTER — TELEPHONE (OUTPATIENT)
Facility: CLINIC | Age: 44
End: 2024-07-17

## 2024-07-17 NOTE — TELEPHONE ENCOUNTER
Notified patient that Dr. Pepper is not accepting any new patients, but did get scheduled with Annie Bob NP.

## 2024-07-17 NOTE — TELEPHONE ENCOUNTER
----- Message from Alejandra Levy MA sent at 7/17/2024 12:36 PM EDT -----  Regarding: FW: [facility] Appointment Request    ----- Message -----  From: Leighton Rivas  Sent: 7/17/2024  12:16 PM EDT  To: [facility] Clinical Staff  Subject: [facility] Appointment Request                          [facility] Appointment Request    Patient needs appointment for [facility] Appointment Type: New to Provider.    Patient Requested Dates(s): any available  Patient Requested Time:any available  Provider Name: Daphne Pepper MD    Reason for Appointment Request: New Patient - No appointments available during search. Wants to establish care with Daphne Pepper MD or any provider that is accepting new patient if Daphne Pepper MD is not available.  --------------------------------------------------------------------------------------------------------------------------    Relationship to Patient: Self     Call Back Information: OK to leave message on voicemail  Preferred Call Back Number: Phone [phone]

## 2025-01-23 ENCOUNTER — OFFICE VISIT (OUTPATIENT)
Facility: CLINIC | Age: 45
End: 2025-01-23
Payer: COMMERCIAL

## 2025-01-23 VITALS
RESPIRATION RATE: 18 BRPM | SYSTOLIC BLOOD PRESSURE: 128 MMHG | WEIGHT: 185.2 LBS | OXYGEN SATURATION: 98 % | DIASTOLIC BLOOD PRESSURE: 78 MMHG | HEART RATE: 79 BPM | TEMPERATURE: 97.3 F | HEIGHT: 67 IN | BODY MASS INDEX: 29.07 KG/M2

## 2025-01-23 DIAGNOSIS — Z12.31 BREAST CANCER SCREENING BY MAMMOGRAM: ICD-10-CM

## 2025-01-23 DIAGNOSIS — Z11.4 ENCOUNTER FOR SCREENING FOR HIV: ICD-10-CM

## 2025-01-23 DIAGNOSIS — Z12.4 CERVICAL CANCER SCREENING: ICD-10-CM

## 2025-01-23 DIAGNOSIS — E55.9 VITAMIN D DEFICIENCY: ICD-10-CM

## 2025-01-23 DIAGNOSIS — Z76.89 ENCOUNTER TO ESTABLISH CARE: ICD-10-CM

## 2025-01-23 DIAGNOSIS — Z11.59 ENCOUNTER FOR HEPATITIS C SCREENING TEST FOR LOW RISK PATIENT: ICD-10-CM

## 2025-01-23 DIAGNOSIS — Z78.9 VARICELLA VACCINATION STATUS UNKNOWN: ICD-10-CM

## 2025-01-23 DIAGNOSIS — Z13.220 SCREENING FOR HYPERLIPIDEMIA: ICD-10-CM

## 2025-01-23 DIAGNOSIS — Z28.20 VACCINE REFUSED BY PATIENT: ICD-10-CM

## 2025-01-23 DIAGNOSIS — Z00.00 WELLNESS EXAMINATION: Primary | ICD-10-CM

## 2025-01-23 DIAGNOSIS — E66.3 OVERWEIGHT WITH BODY MASS INDEX (BMI) OF 29 TO 29.9 IN ADULT: ICD-10-CM

## 2025-01-23 PROCEDURE — 99386 PREV VISIT NEW AGE 40-64: CPT | Performed by: NURSE PRACTITIONER

## 2025-01-23 SDOH — ECONOMIC STABILITY: FOOD INSECURITY: WITHIN THE PAST 12 MONTHS, THE FOOD YOU BOUGHT JUST DIDN'T LAST AND YOU DIDN'T HAVE MONEY TO GET MORE.: NEVER TRUE

## 2025-01-23 SDOH — ECONOMIC STABILITY: FOOD INSECURITY: WITHIN THE PAST 12 MONTHS, YOU WORRIED THAT YOUR FOOD WOULD RUN OUT BEFORE YOU GOT MONEY TO BUY MORE.: NEVER TRUE

## 2025-01-23 ASSESSMENT — ENCOUNTER SYMPTOMS
CONSTIPATION: 0
ABDOMINAL PAIN: 0
TROUBLE SWALLOWING: 0
COUGH: 0
DIARRHEA: 0
SHORTNESS OF BREATH: 0
WHEEZING: 0

## 2025-01-23 ASSESSMENT — PATIENT HEALTH QUESTIONNAIRE - PHQ9
SUM OF ALL RESPONSES TO PHQ9 QUESTIONS 1 & 2: 0
1. LITTLE INTEREST OR PLEASURE IN DOING THINGS: NOT AT ALL
SUM OF ALL RESPONSES TO PHQ QUESTIONS 1-9: 0
2. FEELING DOWN, DEPRESSED OR HOPELESS: NOT AT ALL

## 2025-01-23 NOTE — PROGRESS NOTES
\"Have you been to the ER, urgent care clinic since your last visit?  Hospitalized since your last visit?\"    NO    “Have you seen or consulted any other health care providers outside our system since your last visit?”    NO  /78 (Site: Right Upper Arm, Position: Sitting, Cuff Size: Medium Adult)   Pulse 79   Temp 97.3 °F (36.3 °C) (Temporal)   Resp 18   Ht 1.702 m (5' 7\")   Wt 84 kg (185 lb 3.2 oz)   SpO2 98%   BMI 29.01 kg/m²   Chief Complaint   Patient presents with    Annual Exam     PHQ-9 Total Score: 0 (2025  7:31 AM)        No questionnaires available.                         Have you had a mammogram?”   2024 [facility]W  No breast cancer screening on file      “Have you had a pap smear?”    VPFW    Date of last Cervical Cancer screen (HPV or PAP): 2017         Click Here for Release of Records Request       Identified Patient with 2 Patient Identifiers-Name and

## 2025-01-23 NOTE — PROGRESS NOTES
Subjective  Chief Complaint   Patient presents with    Annual Exam     HPI:  Jeanette Santiago is a 44 y.o. female.  This is a new patient to the practice. Last seen by primary care 2019. Presents for wellness.    Immunizations:  Flu: Recommend, declines  COVID: Recommend, declines  Tetanus: 7/2017    HCV screen: Ordered  HIV screen: Ordered  LMP: 1/8/25  Pap: 8/2023 VPfW  Mammo: 8/2024 VPfW  Smoking status: Never    Moods: job anxiety  PHQ2: 0/2  Diet: healthy  Exercise: three days per week  Vision exams: annual  Dental exams: every 6 months      Past Medical History:   Diagnosis Date    Constipation     Shingles     possible misdiagnosis    TMJ (temporomandibular joint disorder)     Vitamin D deficiency      Family History   Problem Relation Age of Onset    Other Mother         Prediabetes    High Cholesterol Father     No Known Problems Brother     Diabetes Maternal Grandmother     Heart Disease Maternal Grandmother     Bone Cancer Maternal Grandmother     Osteoporosis Maternal Grandmother     High Cholesterol Maternal Grandfather     Heart Disease Maternal Grandfather     Heart Attack Maternal Grandfather     High Cholesterol Paternal Grandmother     Breast Cancer Paternal Grandmother     Lung Cancer Paternal Grandfather     Rheum Arthritis Paternal Grandfather     Heart Disease Paternal Grandfather      Social History     Socioeconomic History    Marital status:      Spouse name: Not on file    Number of children: Not on file    Years of education: Not on file    Highest education level: Not on file   Occupational History    Not on file   Tobacco Use    Smoking status: Never     Passive exposure: Never    Smokeless tobacco: Never   Vaping Use    Vaping status: Never Used   Substance and Sexual Activity    Alcohol use: Not Currently    Drug use: No    Sexual activity: Not on file   Other Topics Concern    Not on file   Social History Narrative    Not on file     Social Determinants of Health     Financial

## 2025-01-24 DIAGNOSIS — E55.9 VITAMIN D DEFICIENCY: Primary | ICD-10-CM

## 2025-01-24 PROBLEM — E78.00 PURE HYPERCHOLESTEROLEMIA: Status: ACTIVE | Noted: 2025-01-24

## 2025-01-24 LAB
25(OH)D3+25(OH)D2 SERPL-MCNC: 20 NG/ML (ref 30–100)
ALBUMIN SERPL-MCNC: 4.7 G/DL (ref 3.9–4.9)
ALP SERPL-CCNC: 83 IU/L (ref 44–121)
ALT SERPL-CCNC: 26 IU/L (ref 0–32)
AST SERPL-CCNC: 25 IU/L (ref 0–40)
BASOPHILS # BLD AUTO: 0 X10E3/UL (ref 0–0.2)
BASOPHILS NFR BLD AUTO: 1 %
BILIRUB SERPL-MCNC: 0.5 MG/DL (ref 0–1.2)
BUN SERPL-MCNC: 9 MG/DL (ref 6–24)
BUN/CREAT SERPL: 13 (ref 9–23)
CALCIUM SERPL-MCNC: 9.7 MG/DL (ref 8.7–10.2)
CHLORIDE SERPL-SCNC: 102 MMOL/L (ref 96–106)
CHOLEST SERPL-MCNC: 181 MG/DL (ref 100–199)
CO2 SERPL-SCNC: 25 MMOL/L (ref 20–29)
CREAT SERPL-MCNC: 0.71 MG/DL (ref 0.57–1)
EGFRCR SERPLBLD CKD-EPI 2021: 109 ML/MIN/1.73
EOSINOPHIL # BLD AUTO: 0.1 X10E3/UL (ref 0–0.4)
EOSINOPHIL NFR BLD AUTO: 2 %
ERYTHROCYTE [DISTWIDTH] IN BLOOD BY AUTOMATED COUNT: 12 % (ref 11.7–15.4)
GLOBULIN SER CALC-MCNC: 2.3 G/DL (ref 1.5–4.5)
GLUCOSE SERPL-MCNC: 86 MG/DL (ref 70–99)
HCT VFR BLD AUTO: 40.9 % (ref 34–46.6)
HCV AB SERPL QL IA: NORMAL
HCV IGG SERPL QL IA: NON REACTIVE
HDLC SERPL-MCNC: 58 MG/DL
HGB BLD-MCNC: 13.7 G/DL (ref 11.1–15.9)
HIV 1+2 AB+HIV1 P24 AG SERPL QL IA: NON REACTIVE
IMM GRANULOCYTES # BLD AUTO: 0 X10E3/UL (ref 0–0.1)
IMM GRANULOCYTES NFR BLD AUTO: 0 %
LDLC SERPL CALC-MCNC: 111 MG/DL (ref 0–99)
LYMPHOCYTES # BLD AUTO: 1.3 X10E3/UL (ref 0.7–3.1)
LYMPHOCYTES NFR BLD AUTO: 20 %
MCH RBC QN AUTO: 30.6 PG (ref 26.6–33)
MCHC RBC AUTO-ENTMCNC: 33.5 G/DL (ref 31.5–35.7)
MCV RBC AUTO: 92 FL (ref 79–97)
MONOCYTES # BLD AUTO: 0.6 X10E3/UL (ref 0.1–0.9)
MONOCYTES NFR BLD AUTO: 10 %
NEUTROPHILS # BLD AUTO: 4.2 X10E3/UL (ref 1.4–7)
NEUTROPHILS NFR BLD AUTO: 67 %
PLATELET # BLD AUTO: 303 X10E3/UL (ref 150–450)
POTASSIUM SERPL-SCNC: 4.5 MMOL/L (ref 3.5–5.2)
PROT SERPL-MCNC: 7 G/DL (ref 6–8.5)
RBC # BLD AUTO: 4.47 X10E6/UL (ref 3.77–5.28)
SODIUM SERPL-SCNC: 140 MMOL/L (ref 134–144)
TRIGL SERPL-MCNC: 65 MG/DL (ref 0–149)
TSH SERPL DL<=0.005 MIU/L-ACNC: 2.64 UIU/ML (ref 0.45–4.5)
VLDLC SERPL CALC-MCNC: 12 MG/DL (ref 5–40)
VZV IGG SER QL IA: NON REACTIVE
WBC # BLD AUTO: 6.2 X10E3/UL (ref 3.4–10.8)

## 2025-01-24 RX ORDER — ACETAMINOPHEN 160 MG
TABLET,DISINTEGRATING ORAL
Qty: 90 CAPSULE | Refills: 3 | Status: SHIPPED | OUTPATIENT
Start: 2025-01-24

## 2025-01-24 RX ORDER — ERGOCALCIFEROL 1.25 MG/1
CAPSULE, LIQUID FILLED ORAL
Qty: 8 CAPSULE | Refills: 0 | Status: SHIPPED | OUTPATIENT
Start: 2025-01-24

## 2025-04-24 DIAGNOSIS — E55.9 VITAMIN D DEFICIENCY: ICD-10-CM
